# Patient Record
Sex: MALE | Race: WHITE
[De-identification: names, ages, dates, MRNs, and addresses within clinical notes are randomized per-mention and may not be internally consistent; named-entity substitution may affect disease eponyms.]

---

## 2017-03-20 ENCOUNTER — TRANSCRIPTION ENCOUNTER (OUTPATIENT)
Age: 37
End: 2017-03-20

## 2017-07-03 ENCOUNTER — OUTPATIENT (OUTPATIENT)
Dept: OUTPATIENT SERVICES | Facility: HOSPITAL | Age: 37
LOS: 1 days | Discharge: HOME | End: 2017-07-03

## 2017-07-03 DIAGNOSIS — J69.0 PNEUMONITIS DUE TO INHALATION OF FOOD AND VOMIT: ICD-10-CM

## 2017-07-06 ENCOUNTER — OUTPATIENT (OUTPATIENT)
Dept: OUTPATIENT SERVICES | Facility: HOSPITAL | Age: 37
LOS: 1 days | Discharge: HOME | End: 2017-07-06

## 2017-07-06 DIAGNOSIS — J69.0 PNEUMONITIS DUE TO INHALATION OF FOOD AND VOMIT: ICD-10-CM

## 2017-07-06 DIAGNOSIS — F84.0 AUTISTIC DISORDER: ICD-10-CM

## 2017-07-06 DIAGNOSIS — Z00.00 ENCOUNTER FOR GENERAL ADULT MEDICAL EXAMINATION WITHOUT ABNORMAL FINDINGS: ICD-10-CM

## 2017-09-20 ENCOUNTER — OUTPATIENT (OUTPATIENT)
Dept: OUTPATIENT SERVICES | Facility: HOSPITAL | Age: 37
LOS: 1 days | Discharge: HOME | End: 2017-09-20

## 2017-09-20 DIAGNOSIS — J69.0 PNEUMONITIS DUE TO INHALATION OF FOOD AND VOMIT: ICD-10-CM

## 2017-09-20 DIAGNOSIS — R10.31 RIGHT LOWER QUADRANT PAIN: ICD-10-CM

## 2017-09-22 ENCOUNTER — OUTPATIENT (OUTPATIENT)
Dept: OUTPATIENT SERVICES | Facility: HOSPITAL | Age: 37
LOS: 1 days | Discharge: HOME | End: 2017-09-22

## 2017-09-22 DIAGNOSIS — J69.0 PNEUMONITIS DUE TO INHALATION OF FOOD AND VOMIT: ICD-10-CM

## 2017-09-22 DIAGNOSIS — D72.829 ELEVATED WHITE BLOOD CELL COUNT, UNSPECIFIED: ICD-10-CM

## 2017-10-26 ENCOUNTER — OUTPATIENT (OUTPATIENT)
Dept: OUTPATIENT SERVICES | Facility: HOSPITAL | Age: 37
LOS: 1 days | Discharge: HOME | End: 2017-10-26

## 2017-10-26 DIAGNOSIS — J69.0 PNEUMONITIS DUE TO INHALATION OF FOOD AND VOMIT: ICD-10-CM

## 2017-10-27 DIAGNOSIS — J02.9 ACUTE PHARYNGITIS, UNSPECIFIED: ICD-10-CM

## 2018-03-26 ENCOUNTER — OUTPATIENT (OUTPATIENT)
Dept: OUTPATIENT SERVICES | Facility: HOSPITAL | Age: 38
LOS: 1 days | Discharge: HOME | End: 2018-03-26

## 2018-03-26 DIAGNOSIS — F84.0 AUTISTIC DISORDER: ICD-10-CM

## 2018-03-26 DIAGNOSIS — Z00.00 ENCOUNTER FOR GENERAL ADULT MEDICAL EXAMINATION WITHOUT ABNORMAL FINDINGS: ICD-10-CM

## 2018-03-26 DIAGNOSIS — E66.01 MORBID (SEVERE) OBESITY DUE TO EXCESS CALORIES: ICD-10-CM

## 2018-03-26 DIAGNOSIS — E78.2 MIXED HYPERLIPIDEMIA: ICD-10-CM

## 2018-03-30 ENCOUNTER — OUTPATIENT (OUTPATIENT)
Dept: OUTPATIENT SERVICES | Facility: HOSPITAL | Age: 38
LOS: 1 days | Discharge: HOME | End: 2018-03-30

## 2018-03-30 DIAGNOSIS — F84.0 AUTISTIC DISORDER: ICD-10-CM

## 2018-03-30 DIAGNOSIS — E66.01 MORBID (SEVERE) OBESITY DUE TO EXCESS CALORIES: ICD-10-CM

## 2018-03-30 DIAGNOSIS — Z00.00 ENCOUNTER FOR GENERAL ADULT MEDICAL EXAMINATION WITHOUT ABNORMAL FINDINGS: ICD-10-CM

## 2018-03-30 DIAGNOSIS — E78.2 MIXED HYPERLIPIDEMIA: ICD-10-CM

## 2018-04-02 ENCOUNTER — OUTPATIENT (OUTPATIENT)
Dept: OUTPATIENT SERVICES | Facility: HOSPITAL | Age: 38
LOS: 1 days | Discharge: HOME | End: 2018-04-02

## 2018-04-02 DIAGNOSIS — Z00.00 ENCOUNTER FOR GENERAL ADULT MEDICAL EXAMINATION WITHOUT ABNORMAL FINDINGS: ICD-10-CM

## 2018-04-02 DIAGNOSIS — E78.00 PURE HYPERCHOLESTEROLEMIA, UNSPECIFIED: ICD-10-CM

## 2018-04-02 DIAGNOSIS — E66.01 MORBID (SEVERE) OBESITY DUE TO EXCESS CALORIES: ICD-10-CM

## 2018-04-02 DIAGNOSIS — F84.0 AUTISTIC DISORDER: ICD-10-CM

## 2018-12-04 ENCOUNTER — OUTPATIENT (OUTPATIENT)
Dept: OUTPATIENT SERVICES | Facility: HOSPITAL | Age: 38
LOS: 1 days | Discharge: HOME | End: 2018-12-04

## 2018-12-05 DIAGNOSIS — J02.9 ACUTE PHARYNGITIS, UNSPECIFIED: ICD-10-CM

## 2019-05-04 ENCOUNTER — OUTPATIENT (OUTPATIENT)
Dept: OUTPATIENT SERVICES | Facility: HOSPITAL | Age: 39
LOS: 1 days | Discharge: HOME | End: 2019-05-04

## 2019-05-04 DIAGNOSIS — Z00.00 ENCOUNTER FOR GENERAL ADULT MEDICAL EXAMINATION WITHOUT ABNORMAL FINDINGS: ICD-10-CM

## 2019-05-04 DIAGNOSIS — F84.0 AUTISTIC DISORDER: ICD-10-CM

## 2019-05-04 DIAGNOSIS — E66.01 MORBID (SEVERE) OBESITY DUE TO EXCESS CALORIES: ICD-10-CM

## 2019-08-21 ENCOUNTER — OUTPATIENT (OUTPATIENT)
Dept: OUTPATIENT SERVICES | Facility: HOSPITAL | Age: 39
LOS: 1 days | Discharge: HOME | End: 2019-08-21

## 2019-08-21 DIAGNOSIS — Z01.20 ENCOUNTER FOR DENTAL EXAMINATION AND CLEANING WITHOUT ABNORMAL FINDINGS: ICD-10-CM

## 2019-09-04 ENCOUNTER — OUTPATIENT (OUTPATIENT)
Dept: OUTPATIENT SERVICES | Facility: HOSPITAL | Age: 39
LOS: 1 days | Discharge: HOME | End: 2019-09-04

## 2019-09-13 DIAGNOSIS — Z01.20 ENCOUNTER FOR DENTAL EXAMINATION AND CLEANING WITHOUT ABNORMAL FINDINGS: ICD-10-CM

## 2020-05-20 ENCOUNTER — OUTPATIENT (OUTPATIENT)
Dept: OUTPATIENT SERVICES | Facility: HOSPITAL | Age: 40
LOS: 1 days | Discharge: HOME | End: 2020-05-20
Payer: MEDICARE

## 2020-05-20 DIAGNOSIS — R10.84 GENERALIZED ABDOMINAL PAIN: ICD-10-CM

## 2020-05-20 PROCEDURE — 76700 US EXAM ABDOM COMPLETE: CPT | Mod: 26

## 2021-09-19 ENCOUNTER — TRANSCRIPTION ENCOUNTER (OUTPATIENT)
Age: 41
End: 2021-09-19

## 2022-01-12 PROBLEM — Z00.00 ENCOUNTER FOR PREVENTIVE HEALTH EXAMINATION: Status: ACTIVE | Noted: 2022-01-12

## 2022-02-02 ENCOUNTER — APPOINTMENT (OUTPATIENT)
Dept: SURGERY | Facility: CLINIC | Age: 42
End: 2022-02-02

## 2022-08-24 ENCOUNTER — EMERGENCY (EMERGENCY)
Facility: HOSPITAL | Age: 42
LOS: 0 days | Discharge: HOME | End: 2022-08-24
Attending: EMERGENCY MEDICINE | Admitting: EMERGENCY MEDICINE

## 2022-08-24 VITALS
DIASTOLIC BLOOD PRESSURE: 86 MMHG | HEART RATE: 94 BPM | SYSTOLIC BLOOD PRESSURE: 139 MMHG | TEMPERATURE: 97 F | OXYGEN SATURATION: 99 % | RESPIRATION RATE: 17 BRPM

## 2022-08-24 DIAGNOSIS — K08.89 OTHER SPECIFIED DISORDERS OF TEETH AND SUPPORTING STRUCTURES: ICD-10-CM

## 2022-08-24 PROCEDURE — 99282 EMERGENCY DEPT VISIT SF MDM: CPT

## 2022-08-24 NOTE — CONSULT NOTE ADULT - SUBJECTIVE AND OBJECTIVE BOX
Patient is a 41y old  Male who presents with a chief complaint of suspected dental pain    HPI: Patient dad report patient making mouth movement that may had correlated to dental pain. Patient report patient eating well.      PAST MEDICAL & SURGICAL HISTORY:    ( -  ) heart valve replacement  ( - ) joint replacement  ( -  ) pregnancy    MEDICATIONS  (STANDING):    MEDICATIONS  (PRN):      Allergies    No Known Allergies    Intolerances        FAMILY HISTORY:      *Last Dental Visit: unknown    Vital Signs Last 24 Hrs  T(C): 36.1 (24 Aug 2022 14:34), Max: 36.1 (24 Aug 2022 14:34)  T(F): 97 (24 Aug 2022 14:34), Max: 97 (24 Aug 2022 14:34)  HR: 94 (24 Aug 2022 14:34) (94 - 94)  BP: 139/86 (24 Aug 2022 14:34) (139/86 - 139/86)  BP(mean): --  RR: 17 (24 Aug 2022 14:34) (17 - 17)  SpO2: 99% (24 Aug 2022 14:34) (99% - 99%)    Parameters below as of 24 Aug 2022 14:34  Patient On (Oxygen Delivery Method): room air        EOE:  TMJ (  - ) clicks                     ( -  ) pops                     (  - ) crepitus             Mandible <<FROM>>             Facial bones and MOM <<grossly intact>>             ( -  ) trismus             ( -  ) lymphadenopathy             ( -  ) swelling             ( -  ) asymmetry             (   ) palpation             ( -  ) dyspnea             ( -  ) dysphagia             ( -  ) loss of consciousness    IOE:  <<permanent>> dentition:  <<multiple missing teeth>>           hard/soft palate:  (  - ) palatal torus, <<No pathology noted>>           tongue/FOM <<No pathology noted>>           labial/buccal mucosa <<No pathology noted>>           ( -  ) percussion           ( - ) palpation           ( -  ) swelling            (  - ) abscess           ( -  ) sinus tract    Dentition present: << Y  >>  Mobility: <<n  >>  Caries: << n  >>     Extraoral exam and intraoral exam : no significant finding. Existing restoration intact.    *DENTAL RADIOGRAPHS: 1 Panoramic, no significant finding    RADIOLOGY & ADDITIONAL STUDIES:    *ASSESSMENT: Patient is a 41y old  Male who presents with a chief complaint of suspected dental pain. Patient dad report patient making mouth movement that may had correlated to dental pain. Extraoral exam and intraoral exam : no significant finding. Existing restoration intact. 1 Panoramic, no significant finding        *PLAN:    Informed father no significant dental finding noted at this time. Recommended monitor patient behavior and eating habit. If behavior worsen return for follow up.    RECOMMENDATIONS:  1) Dental F/U with outpatient dentist for comprehensive dental care.   2) If any difficulty swallowing/breathing, fever occur, return to ER.     Cruz Paz DDS, [id number] Patient is a 41y old  Male who presents with a chief complaint of suspected dental pain    HPI: Patient father report patient making mouth movement that may had correlated to dental pain. Patient father report patient eating well.      PAST MEDICAL & SURGICAL HISTORY:    ( -  ) heart valve replacement  ( - ) joint replacement  ( -  ) pregnancy    MEDICATIONS  (STANDING):    MEDICATIONS  (PRN):      Allergies    No Known Allergies    Intolerances        FAMILY HISTORY:      *Last Dental Visit: unknown    Vital Signs Last 24 Hrs  T(C): 36.1 (24 Aug 2022 14:34), Max: 36.1 (24 Aug 2022 14:34)  T(F): 97 (24 Aug 2022 14:34), Max: 97 (24 Aug 2022 14:34)  HR: 94 (24 Aug 2022 14:34) (94 - 94)  BP: 139/86 (24 Aug 2022 14:34) (139/86 - 139/86)  BP(mean): --  RR: 17 (24 Aug 2022 14:34) (17 - 17)  SpO2: 99% (24 Aug 2022 14:34) (99% - 99%)    Parameters below as of 24 Aug 2022 14:34  Patient On (Oxygen Delivery Method): room air        EOE:  TMJ (  - ) clicks                     ( -  ) pops                     (  - ) crepitus             Mandible <<FROM>>             Facial bones and MOM <<grossly intact>>             ( -  ) trismus             ( -  ) lymphadenopathy             ( -  ) swelling             ( -  ) asymmetry             (   ) palpation             ( -  ) dyspnea             ( -  ) dysphagia             ( -  ) loss of consciousness    IOE:  <<permanent>> dentition:  <<multiple missing teeth>>           hard/soft palate:  (  - ) palatal torus, <<No pathology noted>>           tongue/FOM <<No pathology noted>>           labial/buccal mucosa <<No pathology noted>>           ( -  ) percussion           ( - ) palpation           ( -  ) swelling            (  - ) abscess           ( -  ) sinus tract    Dentition present: << Y  >>  Mobility: <<n  >>  Caries: << n  >>     Extraoral exam and intraoral exam : no significant finding. Existing restoration intact.    *DENTAL RADIOGRAPHS: 1 Panoramic, no significant finding    RADIOLOGY & ADDITIONAL STUDIES:    *ASSESSMENT: Patient is a 41y old  Male who presents with a chief complaint of suspected dental pain. Patient dad report patient making mouth movement that may had correlated to dental pain. Extraoral exam and intraoral exam : no significant finding. Existing restoration intact. 1 Panoramic, no significant finding        *PLAN:    Informed father no significant dental finding noted at this time. Recommended monitor patient behavior and eating habit. If behavior worsen return for follow up.    RECOMMENDATIONS:  1) Dental F/U with outpatient dentist for comprehensive dental care.   2) If any difficulty swallowing/breathing, fever occur, return to ER.     Cruz Paz DDS, [id number]

## 2022-08-24 NOTE — ED PROVIDER NOTE - OBJECTIVE STATEMENT
Patient with hx of MR, making mouth movements that he always does when he has dental pain per aide. Trying to get in touch with dental clinic but unable to do so. Denies fevers.

## 2022-08-24 NOTE — ED PROVIDER NOTE - PHYSICAL EXAMINATION
Vital Signs: I have reviewed the initial vital signs.  Constitutional: appears stated age, no acute distress  Cardiovascular: regular rate, regular rhythm, well-perfused extremities  Respiratory: unlabored respiratory effort, clear to auscultation bilaterally  Psychiatric: appropriate mood, appropriate affect

## 2022-10-28 ENCOUNTER — NON-APPOINTMENT (OUTPATIENT)
Age: 42
End: 2022-10-28

## 2022-11-13 NOTE — ED ADULT NURSE NOTE - CAS TRG GENERAL NORM CIRC DET
Nurses Note -- 4 Eyes      11/13/2022   5:21 PM      Skin assessed during: Admit      [x] No Pressure Injuries Present    [x]Prevention Measures Documented      [] Yes- Altered Skin Integrity Present or Discovered   [] LDA Added if Not in Epic (Describe Wound)   [] New Altered Skin Integrity was Present on Admit and Documented in LDA   [] Wound Image Taken    Wound Care Consulted? No    Attending Nurse:  Rupinder Barker RN     Second RN/Staff Member:  Tyrone Barton      Strong peripheral pulses

## 2022-12-25 ENCOUNTER — NON-APPOINTMENT (OUTPATIENT)
Age: 42
End: 2022-12-25

## 2023-09-20 ENCOUNTER — OUTPATIENT (OUTPATIENT)
Dept: OUTPATIENT SERVICES | Facility: HOSPITAL | Age: 43
LOS: 1 days | End: 2023-09-20
Payer: MEDICAID

## 2023-09-20 DIAGNOSIS — Z01.20 ENCOUNTER FOR DENTAL EXAMINATION AND CLEANING WITHOUT ABNORMAL FINDINGS: ICD-10-CM

## 2023-09-20 DIAGNOSIS — K05.00 ACUTE GINGIVITIS, PLAQUE INDUCED: ICD-10-CM

## 2023-09-20 PROCEDURE — D1110: CPT

## 2023-09-20 PROCEDURE — D9997: CPT

## 2023-09-26 ENCOUNTER — APPOINTMENT (OUTPATIENT)
Dept: OPHTHALMOLOGY | Facility: CLINIC | Age: 43
End: 2023-09-26

## 2023-11-13 ENCOUNTER — EMERGENCY (EMERGENCY)
Facility: HOSPITAL | Age: 43
LOS: 0 days | Discharge: LEFT BEFORE DISCHARGE | End: 2023-11-13
Attending: STUDENT IN AN ORGANIZED HEALTH CARE EDUCATION/TRAINING PROGRAM
Payer: MEDICARE

## 2023-11-13 VITALS
RESPIRATION RATE: 16 BRPM | TEMPERATURE: 98 F | SYSTOLIC BLOOD PRESSURE: 132 MMHG | OXYGEN SATURATION: 98 % | HEART RATE: 98 BPM | DIASTOLIC BLOOD PRESSURE: 86 MMHG

## 2023-11-13 VITALS
DIASTOLIC BLOOD PRESSURE: 81 MMHG | HEART RATE: 75 BPM | SYSTOLIC BLOOD PRESSURE: 125 MMHG | OXYGEN SATURATION: 99 % | RESPIRATION RATE: 18 BRPM | TEMPERATURE: 98 F
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
## 2023-11-13 DIAGNOSIS — M79.662 PAIN IN LEFT LOWER LEG: ICD-10-CM

## 2023-11-13 DIAGNOSIS — Z53.29 PROCEDURE AND TREATMENT NOT CARRIED OUT BECAUSE OF PATIENT'S DECISION FOR OTHER REASONS: ICD-10-CM

## 2023-11-13 PROCEDURE — 99284 EMERGENCY DEPT VISIT MOD MDM: CPT | Mod: 25

## 2023-11-13 PROCEDURE — 73552 X-RAY EXAM OF FEMUR 2/>: CPT | Mod: 50

## 2023-11-13 PROCEDURE — 73590 X-RAY EXAM OF LOWER LEG: CPT | Mod: 26,50

## 2023-11-13 PROCEDURE — 73562 X-RAY EXAM OF KNEE 3: CPT | Mod: 50

## 2023-11-13 PROCEDURE — 72190 X-RAY EXAM OF PELVIS: CPT | Mod: 26

## 2023-11-13 PROCEDURE — 99284 EMERGENCY DEPT VISIT MOD MDM: CPT

## 2023-11-13 PROCEDURE — 72190 X-RAY EXAM OF PELVIS: CPT

## 2023-11-13 PROCEDURE — 73620 X-RAY EXAM OF FOOT: CPT | Mod: 26,50

## 2023-11-13 PROCEDURE — 73590 X-RAY EXAM OF LOWER LEG: CPT | Mod: 50

## 2023-11-13 PROCEDURE — 73600 X-RAY EXAM OF ANKLE: CPT | Mod: 26,50

## 2023-11-13 PROCEDURE — 73620 X-RAY EXAM OF FOOT: CPT | Mod: 50

## 2023-11-13 PROCEDURE — 73552 X-RAY EXAM OF FEMUR 2/>: CPT | Mod: 26,50

## 2023-11-13 PROCEDURE — 73562 X-RAY EXAM OF KNEE 3: CPT | Mod: 26,50

## 2023-11-13 PROCEDURE — 73600 X-RAY EXAM OF ANKLE: CPT | Mod: 50

## 2023-11-13 NOTE — ED PROVIDER NOTE - CLINICAL SUMMARY MEDICAL DECISION MAKING FREE TEXT BOX
43M h/o autism p/w concern for LLE pain after a fall. He fell a few days ago on concrete. Since then the father has noticed worsening gait, favoring his L foot and ankle. Pt is otherwise at baseline and denies any complaints. There is no change in appetite, cough, change in breathing, vomiting, diarrhea, rash, fever.    VS: normal.    PE: See above. Only significant for L ankle TTP diffusely with no obvious deformity upon visual inspection. There are no skin changes, with normal pulses and ROM of L ankle as well as the rest of the LLE. Pt is able to ambulate however tends to favor that ankle or foot.     Workup: XR imaging of b/l LE's due to pt's intellectual disability. Labs are not required at least at this time due to no other change in the patient's appearance or behavior to indicate that any metabolic or infectious process is currently occurring.    ED Course: See progress note. No obvious abnormality to wet reads however there was concern for fx of L foot/ankle and I was pending discussion with radiology. I informed the father that pt may require a splint and be non-weight bearing depending on the possibility of an injury. Pt eloped.

## 2023-11-13 NOTE — ED PROVIDER NOTE - OBJECTIVE STATEMENT
43M h/o autism p/w concern for LLE pain after a fall. He fell a few days ago on concrete. Since then the father has noticed worsening gait, favoring his L foot and ankle. Pt is otherwise at baseline and denies any complaints. There is no change in appetite, cough, change in breathing, vomiting, diarrhea, rash, fever.

## 2023-11-13 NOTE — ED PROVIDER NOTE - PROGRESS NOTE DETAILS
AA: 2 calls made to radiology team at  for reads of L ankle and L foot XR. I did update the father and told him that I wanted a radiologist to take a look at the L ankle and foot XR however he was concerned that his son has not eaten. While this was pending, the pt's father and pt decided to leave without a discussion with myself. Will naseem as eloped.

## 2023-11-13 NOTE — ED PROVIDER NOTE - PHYSICAL EXAMINATION
Constitutional: Well developed, well nourished. NAD. Developmental delay/autistic.  Head: Atraumatic.  Eyes: PERRLA. EOMI without discomfort.   ENT: No nasal discharge. Mucous membranes moist.  Neck: Supple. Painless ROM.  Cardiovascular: Regular rhythm. Regular rate. Normal S1 and S2. No murmurs. 2+ pulses in all extremities.   Pulmonary: Normal respiratory rate and effort. Lungs clear to auscultation bilaterally. No wheezing, rales, or rhonchi. Bilateral, equal lung expansion.   Abdominal: Soft. Nondistended. Nontender. No rebound or guarding.   Extremities. Pelvis stable. No lower extremity edema. Symmetric calves. Mild L ankle TTP. No obvious deformity. normal gait.  Skin: No rashes. No erythema or ecchymosis.  Neuro: AAOx3. No focal neurological deficits. Normal gait.  Psych: Normal mood. Normal affect.

## 2024-01-12 ENCOUNTER — INPATIENT (INPATIENT)
Facility: HOSPITAL | Age: 44
LOS: 0 days | Discharge: HOME | DRG: 177 | End: 2024-01-13
Attending: HOSPITALIST | Admitting: INTERNAL MEDICINE
Payer: MEDICARE

## 2024-01-12 VITALS
HEIGHT: 66 IN | HEART RATE: 138 BPM | WEIGHT: 197.98 LBS | DIASTOLIC BLOOD PRESSURE: 97 MMHG | SYSTOLIC BLOOD PRESSURE: 137 MMHG | OXYGEN SATURATION: 98 % | TEMPERATURE: 99 F | RESPIRATION RATE: 20 BRPM

## 2024-01-12 DIAGNOSIS — J96.02 ACUTE RESPIRATORY FAILURE WITH HYPERCAPNIA: ICD-10-CM

## 2024-01-12 LAB
ALBUMIN SERPL ELPH-MCNC: 4.4 G/DL — SIGNIFICANT CHANGE UP (ref 3.5–5.2)
ALBUMIN SERPL ELPH-MCNC: 4.4 G/DL — SIGNIFICANT CHANGE UP (ref 3.5–5.2)
ALP SERPL-CCNC: 112 U/L — SIGNIFICANT CHANGE UP (ref 30–115)
ALP SERPL-CCNC: 112 U/L — SIGNIFICANT CHANGE UP (ref 30–115)
ALT FLD-CCNC: 34 U/L — SIGNIFICANT CHANGE UP (ref 0–41)
ALT FLD-CCNC: 34 U/L — SIGNIFICANT CHANGE UP (ref 0–41)
ANION GAP SERPL CALC-SCNC: 22 MMOL/L — HIGH (ref 7–14)
ANION GAP SERPL CALC-SCNC: 22 MMOL/L — HIGH (ref 7–14)
AST SERPL-CCNC: 64 U/L — HIGH (ref 0–41)
AST SERPL-CCNC: 64 U/L — HIGH (ref 0–41)
BASE EXCESS BLDV CALC-SCNC: 1.7 MMOL/L — SIGNIFICANT CHANGE UP (ref -2–3)
BASE EXCESS BLDV CALC-SCNC: 1.7 MMOL/L — SIGNIFICANT CHANGE UP (ref -2–3)
BASOPHILS # BLD AUTO: 0 K/UL — SIGNIFICANT CHANGE UP (ref 0–0.2)
BASOPHILS # BLD AUTO: 0 K/UL — SIGNIFICANT CHANGE UP (ref 0–0.2)
BASOPHILS NFR BLD AUTO: 0 % — SIGNIFICANT CHANGE UP (ref 0–1)
BASOPHILS NFR BLD AUTO: 0 % — SIGNIFICANT CHANGE UP (ref 0–1)
BILIRUB SERPL-MCNC: 0.6 MG/DL — SIGNIFICANT CHANGE UP (ref 0.2–1.2)
BILIRUB SERPL-MCNC: 0.6 MG/DL — SIGNIFICANT CHANGE UP (ref 0.2–1.2)
BUN SERPL-MCNC: 15 MG/DL — SIGNIFICANT CHANGE UP (ref 10–20)
BUN SERPL-MCNC: 15 MG/DL — SIGNIFICANT CHANGE UP (ref 10–20)
CALCIUM SERPL-MCNC: 9.6 MG/DL — SIGNIFICANT CHANGE UP (ref 8.4–10.5)
CALCIUM SERPL-MCNC: 9.6 MG/DL — SIGNIFICANT CHANGE UP (ref 8.4–10.5)
CHLORIDE SERPL-SCNC: 105 MMOL/L — SIGNIFICANT CHANGE UP (ref 98–110)
CHLORIDE SERPL-SCNC: 105 MMOL/L — SIGNIFICANT CHANGE UP (ref 98–110)
CO2 SERPL-SCNC: 14 MMOL/L — LOW (ref 17–32)
CO2 SERPL-SCNC: 14 MMOL/L — LOW (ref 17–32)
CREAT SERPL-MCNC: 1 MG/DL — SIGNIFICANT CHANGE UP (ref 0.7–1.5)
CREAT SERPL-MCNC: 1 MG/DL — SIGNIFICANT CHANGE UP (ref 0.7–1.5)
EGFR: 95 ML/MIN/1.73M2 — SIGNIFICANT CHANGE UP
EGFR: 95 ML/MIN/1.73M2 — SIGNIFICANT CHANGE UP
EOSINOPHIL # BLD AUTO: 0 K/UL — SIGNIFICANT CHANGE UP (ref 0–0.7)
EOSINOPHIL # BLD AUTO: 0 K/UL — SIGNIFICANT CHANGE UP (ref 0–0.7)
EOSINOPHIL NFR BLD AUTO: 0 % — SIGNIFICANT CHANGE UP (ref 0–8)
EOSINOPHIL NFR BLD AUTO: 0 % — SIGNIFICANT CHANGE UP (ref 0–8)
FLUAV AG NPH QL: SIGNIFICANT CHANGE UP
FLUAV AG NPH QL: SIGNIFICANT CHANGE UP
FLUBV AG NPH QL: SIGNIFICANT CHANGE UP
FLUBV AG NPH QL: SIGNIFICANT CHANGE UP
GAS PNL BLDV: 136 MMOL/L — SIGNIFICANT CHANGE UP (ref 136–145)
GAS PNL BLDV: 136 MMOL/L — SIGNIFICANT CHANGE UP (ref 136–145)
GAS PNL BLDV: SIGNIFICANT CHANGE UP
GLUCOSE SERPL-MCNC: 99 MG/DL — SIGNIFICANT CHANGE UP (ref 70–99)
GLUCOSE SERPL-MCNC: 99 MG/DL — SIGNIFICANT CHANGE UP (ref 70–99)
HCO3 BLDV-SCNC: 27 MMOL/L — SIGNIFICANT CHANGE UP (ref 22–29)
HCO3 BLDV-SCNC: 27 MMOL/L — SIGNIFICANT CHANGE UP (ref 22–29)
HCT VFR BLD CALC: 48.5 % — SIGNIFICANT CHANGE UP (ref 42–52)
HCT VFR BLD CALC: 48.5 % — SIGNIFICANT CHANGE UP (ref 42–52)
HGB BLD-MCNC: 16.4 G/DL — SIGNIFICANT CHANGE UP (ref 14–18)
HGB BLD-MCNC: 16.4 G/DL — SIGNIFICANT CHANGE UP (ref 14–18)
LACTATE BLDV-MCNC: 2.7 MMOL/L — HIGH (ref 0.5–2)
LACTATE BLDV-MCNC: 2.7 MMOL/L — HIGH (ref 0.5–2)
LYMPHOCYTES # BLD AUTO: 1.68 K/UL — SIGNIFICANT CHANGE UP (ref 1.2–3.4)
LYMPHOCYTES # BLD AUTO: 1.68 K/UL — SIGNIFICANT CHANGE UP (ref 1.2–3.4)
LYMPHOCYTES # BLD AUTO: 7.6 % — LOW (ref 20.5–51.1)
LYMPHOCYTES # BLD AUTO: 7.6 % — LOW (ref 20.5–51.1)
MCHC RBC-ENTMCNC: 29.9 PG — SIGNIFICANT CHANGE UP (ref 27–31)
MCHC RBC-ENTMCNC: 29.9 PG — SIGNIFICANT CHANGE UP (ref 27–31)
MCHC RBC-ENTMCNC: 33.8 G/DL — SIGNIFICANT CHANGE UP (ref 32–37)
MCHC RBC-ENTMCNC: 33.8 G/DL — SIGNIFICANT CHANGE UP (ref 32–37)
MCV RBC AUTO: 88.3 FL — SIGNIFICANT CHANGE UP (ref 80–94)
MCV RBC AUTO: 88.3 FL — SIGNIFICANT CHANGE UP (ref 80–94)
MONOCYTES # BLD AUTO: 1.13 K/UL — HIGH (ref 0.1–0.6)
MONOCYTES # BLD AUTO: 1.13 K/UL — HIGH (ref 0.1–0.6)
MONOCYTES NFR BLD AUTO: 5.1 % — SIGNIFICANT CHANGE UP (ref 1.7–9.3)
MONOCYTES NFR BLD AUTO: 5.1 % — SIGNIFICANT CHANGE UP (ref 1.7–9.3)
NEUTROPHILS # BLD AUTO: 18.6 K/UL — HIGH (ref 1.4–6.5)
NEUTROPHILS # BLD AUTO: 18.6 K/UL — HIGH (ref 1.4–6.5)
NEUTROPHILS NFR BLD AUTO: 83.9 % — HIGH (ref 42.2–75.2)
NEUTROPHILS NFR BLD AUTO: 83.9 % — HIGH (ref 42.2–75.2)
PCO2 BLDV: 45 MMHG — SIGNIFICANT CHANGE UP (ref 42–55)
PCO2 BLDV: 45 MMHG — SIGNIFICANT CHANGE UP (ref 42–55)
PH BLDV: 7.39 — SIGNIFICANT CHANGE UP (ref 7.32–7.43)
PH BLDV: 7.39 — SIGNIFICANT CHANGE UP (ref 7.32–7.43)
PLATELET # BLD AUTO: 309 K/UL — SIGNIFICANT CHANGE UP (ref 130–400)
PLATELET # BLD AUTO: 309 K/UL — SIGNIFICANT CHANGE UP (ref 130–400)
PMV BLD: 9.7 FL — SIGNIFICANT CHANGE UP (ref 7.4–10.4)
PMV BLD: 9.7 FL — SIGNIFICANT CHANGE UP (ref 7.4–10.4)
PO2 BLDV: 54 MMHG — HIGH (ref 25–45)
PO2 BLDV: 54 MMHG — HIGH (ref 25–45)
POTASSIUM BLDV-SCNC: 4.6 MMOL/L — SIGNIFICANT CHANGE UP (ref 3.5–5.1)
POTASSIUM BLDV-SCNC: 4.6 MMOL/L — SIGNIFICANT CHANGE UP (ref 3.5–5.1)
POTASSIUM SERPL-MCNC: 5.7 MMOL/L — HIGH (ref 3.5–5)
POTASSIUM SERPL-MCNC: 5.7 MMOL/L — HIGH (ref 3.5–5)
POTASSIUM SERPL-SCNC: 5.7 MMOL/L — HIGH (ref 3.5–5)
POTASSIUM SERPL-SCNC: 5.7 MMOL/L — HIGH (ref 3.5–5)
PROT SERPL-MCNC: 7.6 G/DL — SIGNIFICANT CHANGE UP (ref 6–8)
PROT SERPL-MCNC: 7.6 G/DL — SIGNIFICANT CHANGE UP (ref 6–8)
RBC # BLD: 5.49 M/UL — SIGNIFICANT CHANGE UP (ref 4.7–6.1)
RBC # BLD: 5.49 M/UL — SIGNIFICANT CHANGE UP (ref 4.7–6.1)
RBC # FLD: 13.4 % — SIGNIFICANT CHANGE UP (ref 11.5–14.5)
RBC # FLD: 13.4 % — SIGNIFICANT CHANGE UP (ref 11.5–14.5)
RSV RNA NPH QL NAA+NON-PROBE: SIGNIFICANT CHANGE UP
RSV RNA NPH QL NAA+NON-PROBE: SIGNIFICANT CHANGE UP
SAO2 % BLDV: 86.9 % — SIGNIFICANT CHANGE UP (ref 67–88)
SAO2 % BLDV: 86.9 % — SIGNIFICANT CHANGE UP (ref 67–88)
SARS-COV-2 RNA SPEC QL NAA+PROBE: DETECTED
SARS-COV-2 RNA SPEC QL NAA+PROBE: DETECTED
SODIUM SERPL-SCNC: 141 MMOL/L — SIGNIFICANT CHANGE UP (ref 135–146)
SODIUM SERPL-SCNC: 141 MMOL/L — SIGNIFICANT CHANGE UP (ref 135–146)
WBC # BLD: 22.17 K/UL — HIGH (ref 4.8–10.8)
WBC # BLD: 22.17 K/UL — HIGH (ref 4.8–10.8)
WBC # FLD AUTO: 22.17 K/UL — HIGH (ref 4.8–10.8)
WBC # FLD AUTO: 22.17 K/UL — HIGH (ref 4.8–10.8)

## 2024-01-12 PROCEDURE — 83605 ASSAY OF LACTIC ACID: CPT

## 2024-01-12 PROCEDURE — 80053 COMPREHEN METABOLIC PANEL: CPT

## 2024-01-12 PROCEDURE — 94660 CPAP INITIATION&MGMT: CPT

## 2024-01-12 PROCEDURE — 71045 X-RAY EXAM CHEST 1 VIEW: CPT

## 2024-01-12 PROCEDURE — 92610 EVALUATE SWALLOWING FUNCTION: CPT | Mod: GN

## 2024-01-12 PROCEDURE — 85379 FIBRIN DEGRADATION QUANT: CPT

## 2024-01-12 PROCEDURE — 36415 COLL VENOUS BLD VENIPUNCTURE: CPT

## 2024-01-12 PROCEDURE — 83735 ASSAY OF MAGNESIUM: CPT

## 2024-01-12 PROCEDURE — 99291 CRITICAL CARE FIRST HOUR: CPT

## 2024-01-12 PROCEDURE — 85025 COMPLETE CBC W/AUTO DIFF WBC: CPT

## 2024-01-12 PROCEDURE — 99223 1ST HOSP IP/OBS HIGH 75: CPT

## 2024-01-12 PROCEDURE — 71045 X-RAY EXAM CHEST 1 VIEW: CPT | Mod: 26

## 2024-01-12 PROCEDURE — 84145 PROCALCITONIN (PCT): CPT

## 2024-01-12 RX ORDER — ENOXAPARIN SODIUM 100 MG/ML
40 INJECTION SUBCUTANEOUS EVERY 24 HOURS
Refills: 0 | Status: DISCONTINUED | OUTPATIENT
Start: 2024-01-12 | End: 2024-01-13

## 2024-01-12 RX ORDER — SODIUM CHLORIDE 9 MG/ML
1000 INJECTION, SOLUTION INTRAVENOUS
Refills: 0 | Status: DISCONTINUED | OUTPATIENT
Start: 2024-01-12 | End: 2024-01-13

## 2024-01-12 RX ORDER — PANTOPRAZOLE SODIUM 20 MG/1
40 TABLET, DELAYED RELEASE ORAL
Refills: 0 | Status: DISCONTINUED | OUTPATIENT
Start: 2024-01-12 | End: 2024-01-13

## 2024-01-12 RX ORDER — ESCITALOPRAM OXALATE 10 MG/1
2 TABLET, FILM COATED ORAL
Refills: 0 | DISCHARGE

## 2024-01-12 RX ORDER — LEVOTHYROXINE SODIUM 125 MCG
1 TABLET ORAL
Refills: 0 | DISCHARGE

## 2024-01-12 RX ORDER — ALBUTEROL 90 UG/1
2.5 AEROSOL, METERED ORAL ONCE
Refills: 0 | Status: COMPLETED | OUTPATIENT
Start: 2024-01-12 | End: 2024-01-12

## 2024-01-12 RX ORDER — AMPICILLIN SODIUM AND SULBACTAM SODIUM 250; 125 MG/ML; MG/ML
3 INJECTION, POWDER, FOR SUSPENSION INTRAMUSCULAR; INTRAVENOUS ONCE
Refills: 0 | Status: COMPLETED | OUTPATIENT
Start: 2024-01-12 | End: 2024-01-13

## 2024-01-12 RX ORDER — SODIUM CHLORIDE 9 MG/ML
1000 INJECTION, SOLUTION INTRAVENOUS ONCE
Refills: 0 | Status: COMPLETED | OUTPATIENT
Start: 2024-01-12 | End: 2024-01-12

## 2024-01-12 RX ORDER — AMPICILLIN SODIUM AND SULBACTAM SODIUM 250; 125 MG/ML; MG/ML
3 INJECTION, POWDER, FOR SUSPENSION INTRAMUSCULAR; INTRAVENOUS EVERY 6 HOURS
Refills: 0 | Status: DISCONTINUED | OUTPATIENT
Start: 2024-01-13 | End: 2024-01-13

## 2024-01-12 RX ORDER — AMPICILLIN SODIUM AND SULBACTAM SODIUM 250; 125 MG/ML; MG/ML
INJECTION, POWDER, FOR SUSPENSION INTRAMUSCULAR; INTRAVENOUS
Refills: 0 | Status: DISCONTINUED | OUTPATIENT
Start: 2024-01-13 | End: 2024-01-13

## 2024-01-12 RX ORDER — AMPICILLIN SODIUM AND SULBACTAM SODIUM 250; 125 MG/ML; MG/ML
3 INJECTION, POWDER, FOR SUSPENSION INTRAMUSCULAR; INTRAVENOUS ONCE
Refills: 0 | Status: COMPLETED | OUTPATIENT
Start: 2024-01-12 | End: 2024-01-12

## 2024-01-12 RX ORDER — DEXAMETHASONE 0.5 MG/5ML
10 ELIXIR ORAL ONCE
Refills: 0 | Status: COMPLETED | OUTPATIENT
Start: 2024-01-12 | End: 2024-01-12

## 2024-01-12 RX ADMIN — ALBUTEROL 2.5 MILLIGRAM(S): 90 AEROSOL, METERED ORAL at 14:31

## 2024-01-12 RX ADMIN — AMPICILLIN SODIUM AND SULBACTAM SODIUM 200 GRAM(S): 250; 125 INJECTION, POWDER, FOR SUSPENSION INTRAMUSCULAR; INTRAVENOUS at 14:55

## 2024-01-12 RX ADMIN — SODIUM CHLORIDE 1000 MILLILITER(S): 9 INJECTION, SOLUTION INTRAVENOUS at 14:21

## 2024-01-12 RX ADMIN — SODIUM CHLORIDE 1000 MILLILITER(S): 9 INJECTION, SOLUTION INTRAVENOUS at 15:40

## 2024-01-12 RX ADMIN — SODIUM CHLORIDE 75 MILLILITER(S): 9 INJECTION, SOLUTION INTRAVENOUS at 23:53

## 2024-01-12 RX ADMIN — Medication 102 MILLIGRAM(S): at 14:06

## 2024-01-12 NOTE — H&P ADULT - HISTORY OF PRESENT ILLNESS
43-year-old male history of autism, nonverbal presenting for evaluation of lethargy, SOB and hypoxia after a dental procedure.   Patient was intubated for dentalcleaning at an ambulatory dental office with deep sedation (as pt difficult). Per office records " pt given [id number], fentanyl 100mg and versed 2mg for premedication, propofol 100mg and robinul 2mg for induction.  pt was under anesthesia from 9am to 10:05 am." anesthesia paperwork states "10 minutes post intubation, foamy liquid removed from ett. pt saturated 89%. copious amounts of foamy liquid was removed over 30 minutes."  Father says he walked out of the dental office, was taked to his PCP where his spo2 further dropped and was sent to the ED.  as per ED record: pt initially more tired at triage but is more alert as he comes to critical care area.   At my encounter pt is awake alert comfortably sleeping, saturating 100% on 4L NC.    in ED,   spo2 83 on RA ==> was placed on BiPAP  wbc 22.7   K 5.7   AG 22  AST 64  lact 5.1 ==> 2.1 on VBG  pco2 79 on ABG, HCO3 31 on ABG  ekg: sinus tahcy  cxr: no new path    Pt was placed on Bipap in the ED ==> NRB ==> NC  sp unasyn  +  2l bolus  +  dexa 10   +   albuterol     43-year-old male history of autism, nonverbal presenting for evaluation of lethargy, SOB and hypoxia after a dental procedure.   Patient was intubated for dentalcleaning at an ambulatory dental office with deep sedation (as pt difficult). Per office records " pt given [id number], fentanyl 100mg and versed 2mg for premedication, propofol 100mg and robinul 2mg for induction.  pt was under anesthesia from 9am to 10:05 am." anesthesia paperwork states "10 minutes post intubation, foamy liquid removed from ett. pt saturated 89%. copious amounts of foamy liquid was removed over 30 minutes."  Father says he walked out of the dental office, was taked to his PCP where his spo2 further dropped and was sent to the ED.  as per ED record: pt initially more tired at triage but is more alert as he comes to critical care area.   At my encounter pt is awake alert comfortably sleeping, saturating 100% on 4L NC.    in ED,   spo2 83 on RA ==> was placed on BiPAP  wbc 22.7   K 5.7   AG 22  AST 64  lact 5.1 ==> 2.1 on VBG  pco2 79 on ABG, HCO3 31 on ABG  ekg: sinus tahcy  cxr: no new path    Pt was placed on Bipap in the ED ==> NRB ==> NC  sp unasyn  +  2l bolus  +  dexa 10   +   albuterol

## 2024-01-12 NOTE — ED PROVIDER NOTE - PHYSICAL EXAMINATION
Vital Signs: I have reviewed the initial vital signs.  HEENT: Airway patent, moist MM, EOMI, PERRLA. (+) hoarse voice  CV: tachycardic no murmurs  Lungs: coarse breath sounds BL lower  ABD: Non-tender, Non-distended,   MSK: Neck supple, nontender, normal range of motion,   INTEG: Skin warm, dry, no rash.  NEURO: A&Ox1, moving all extremities, sleepy but at baseline per family

## 2024-01-12 NOTE — ED ADULT TRIAGE NOTE - CHIEF COMPLAINT QUOTE
Pt intubated today for dental procedure, sent by outpt MD due to possible aspiration and SOB. Pt lethargic in triage. Pt intubated today for dental procedure, sent by outpt MD due to possible aspiration and SOB. Pt lethargic in triage.  PTA.

## 2024-01-12 NOTE — ED PROVIDER NOTE - COVID-19 ORDERING FACILITY
[facility]  - [facility] at [facility] [place] [facility]  - [facility] at [facility]

## 2024-01-12 NOTE — ED ADULT NURSE NOTE - NSFALLUNIVINTERV_ED_ALL_ED
Bed/Stretcher in lowest position, wheels locked, appropriate side rails in place/Call bell, personal items and telephone in reach/Instruct patient to call for assistance before getting out of bed/chair/stretcher/Non-slip footwear applied when patient is off stretcher/[place] to call system/Physically safe environment - no spills, clutter or unnecessary equipment/Purposeful proactive rounding/Room/bathroom lighting operational, light cord in reach Bed/Stretcher in lowest position, wheels locked, appropriate side rails in place/Call bell, personal items and telephone in reach/Instruct patient to call for assistance before getting out of bed/chair/stretcher/Non-slip footwear applied when patient is off stretcher/[place] to call system/Physically safe environment - no spills, clutter or unnecessary equipment/Purposeful proactive rounding/Room/bathroom lighting operational, light cord in reach

## 2024-01-12 NOTE — H&P ADULT - ATTENDING COMMENTS
43-year-old male history of autism, nonverbal presenting for evaluation of lethargy, possible aspiration and shortness of breath after dental procedure.  Patient was intubated for dental rehab of dental caries and ambulatory dental office with deep sedation. Per office records " Patient received fentanyl 100, Versed 2, ketamine 10.  Was induced with propofol 100 and ana laura apparently 2.  10 minutes postintubation foamy liquid was removed from ET tube patient desaturated to 89% with copious amount of foam and liquid removed over the past 30 minutes."  Patient was sent to the ED for follow-up. 43-year-old male history of autism, nonverbal presenting for evaluation of lethargy, possible aspiration and shortness of breath after dental procedure.  Patient was intubated for dental rehab of dental caries and ambulatory dental office with deep sedation. Per office records " Patient received fentanyl 100, Versed 2, ketamine 10.  Was induced with propofol 100 and ana laura apparently 2.  10 minutes postintubation foamy liquid was removed from ET tube patient desaturated to 89% with copious amount of foam and liquid removed over the past 30 minutes."  Patient father was told by the anesthesiologist he was concerned for secretion and his lung sounds. He recommended to take patient to the ED. Father opted to take son to the PCP and have him evaluated by him . However he said physican saw that the patient is hypoxic and tachypneic . Patient was sent to the ED for follow-up.    In the ED     T(F): 99 (12 Jan 2024 13:42), Max: 99 (12 Jan 2024 13:42)  HR: 105 (12 Jan 2024 20:00) (105 - 138)  BP: 127/66 (12 Jan 2024 20:00) (127/66 - 137/97)  RR: 18 (12 Jan 2024 20:00) (16 - 20)  SpO2: 97% (12 Jan 2024 20:00) (83% - 99%)    O2 Parameters below as of 12 Jan 2024 22:28  Patient On (Oxygen Delivery Method): nasal cannula  O2 Flow (L/min): 3      LABS: significant for leukocytosis. VBG showing Hypercapnia resolved with BIPAP    Patient examined off bipap and tolerating it well       < from: Xray Chest 1 View- PORTABLE-Urgent (01.12.24 @ 14:36) >    Impression:    Low lung volumes with no radiographic evidence of acute cardiopulmonary   disease.    < end of copied text >  GENERAL: NAD, lying in bed comfortably  CHEST/LUNG: lungs clear.   HEART: Regular rate and rhythm; No murmurs, rubs, or gallops  ABDOMEN: Bowel sounds present; Soft, Nontender, Nondistended.    EXTREMITIES:  no edema  NERVOUS SYSTEM:  unable to assess as patient is nonverbal but follows commands  MSK: FROM all 4 extremities, full and equal strengths    Impression    AHFR with hypercapnia - possible OHS/RUFINO  Aspiration pneumonitis  tachycardia   Hypothyroid  Anxiety  Autism     Plan   Continue unasyn   feeding if tolerating off bipap/ fluid if not eating   wean off o2   Bipap at night and as needed   Check tsh   repeat cxr in am   continue home meds   hold benzo   Pulm follow up outpatient for sleep study     Dispo: wean off bipap, IV abx, monitor for fever   likely anticipate dc simone am 43-year-old male history of autism, nonverbal presenting for evaluation of lethargy, possible aspiration and shortness of breath after dental procedure.  Patient was intubated for dental rehab of dental caries and ambulatory dental office with deep sedation. Per office records " Patient received fentanyl 100, Versed 2, ketamine 10.  Was induced with propofol 100 and ana laura apparently 2.  10 minutes postintubation foamy liquid was removed from ET tube patient desaturated to 89% with copious amount of foam and liquid removed over the past 30 minutes."  Patient father was told by the anesthesiologist he was concerned for secretion and his lung sounds. He recommended to take patient to the ED. Father opted to take son to the PCP and have him evaluated by him . However he said physician saw that the patient is hypoxic and tachypneic . Patient was sent to the ED for follow-up.    In the ED     T(F): 99 (12 Jan 2024 13:42), Max: 99 (12 Jan 2024 13:42)  HR: 105 (12 Jan 2024 20:00) (105 - 138)  BP: 127/66 (12 Jan 2024 20:00) (127/66 - 137/97)  RR: 18 (12 Jan 2024 20:00) (16 - 20)  SpO2: 97% (12 Jan 2024 20:00) (83% - 99%)    O2 Parameters below as of 12 Jan 2024 22:28  Patient On (Oxygen Delivery Method): nasal cannula  O2 Flow (L/min): 3      LABS: significant for leukocytosis. VBG showing Hypercapnia resolved with BIPAP    Patient examined off bipap and tolerating it well       < from: Xray Chest 1 View- PORTABLE-Urgent (01.12.24 @ 14:36) >    Impression:    Low lung volumes with no radiographic evidence of acute cardiopulmonary   disease.    < end of copied text >  GENERAL: NAD, lying in bed comfortably  CHEST/LUNG: lungs clear.   HEART: Regular rate and rhythm; No murmurs, rubs, or gallops  ABDOMEN: Bowel sounds present; Soft, Nontender, Nondistended.    EXTREMITIES:  no edema  NERVOUS SYSTEM:  unable to assess as patient is nonverbal but follows commands  MSK: FROM all 4 extremities, full and equal strengths    Impression    AHFR with hypercapnia - possible OHS/RUFINO  Aspiration pneumonitis?  HAGMA- lactic acidosis   tachycardia   Hypothyroid  Anxiety  Autism     Plan   Continue unasyn   start fluids 75 cc/hr  Duoneb prn   feeding if tolerating off bipap  wean off o2   Bipap at night and as needed   Check tsh   repeat lactate  repeat cxr in am   continue home meds   hold benzo   Pulm follow up outpatient for sleep study     Dispo: wean off bipap, IV abx, monitor for fever , lactic acid,   likely anticipate dc simone am 43-year-old male history of autism, nonverbal presenting for evaluation of lethargy, possible aspiration and shortness of breath after dental procedure.  Patient was intubated for dental rehab of dental caries and ambulatory dental office with deep sedation. Per office records " Patient received fentanyl 100, Versed 2, ketamine 10.  Was induced with propofol 100 and ana laura apparently 2.  10 minutes postintubation foamy liquid was removed from ET tube patient desaturated to 89% with copious amount of foam and liquid removed over the past 30 minutes."  Patient father was told by the anesthesiologist he was concerned for secretion and his lung sounds. He recommended to take patient to the ED. Father opted to take son to the PCP and have him evaluated by him . However he said physician saw that the patient is hypoxic and tachypneic . Patient was sent to the ED for follow-up.    In the ED     T(F): 99 (12 Jan 2024 13:42), Max: 99 (12 Jan 2024 13:42)  HR: 105 (12 Jan 2024 20:00) (105 - 138)  BP: 127/66 (12 Jan 2024 20:00) (127/66 - 137/97)  RR: 18 (12 Jan 2024 20:00) (16 - 20)  SpO2: 97% (12 Jan 2024 20:00) (83% - 99%)    O2 Parameters below as of 12 Jan 2024 22:28  Patient On (Oxygen Delivery Method): nasal cannula  O2 Flow (L/min): 3      LABS: significant for leukocytosis. VBG showing Hypercapnia resolved with BIPAP    Patient examined off bipap and tolerating it well       < from: Xray Chest 1 View- PORTABLE-Urgent (01.12.24 @ 14:36) >    Impression:    Low lung volumes with no radiographic evidence of acute cardiopulmonary   disease.    < end of copied text >  GENERAL: NAD, lying in bed comfortably  CHEST/LUNG: lungs clear.   HEART: Regular rate and rhythm; No murmurs, rubs, or gallops  ABDOMEN: Bowel sounds present; Soft, Nontender, Nondistended.    EXTREMITIES:  no edema  NERVOUS SYSTEM:  unable to assess as patient is nonverbal but follows commands  MSK: FROM all 4 extremities, full and equal strengths    Impression    AHFR with hypercapnia - possible OHS/RUIFNO  Aspiration pneumonitis?  HAGMA- lactic acidosis   tachycardia   Hypothyroid  Anxiety  Autism     Plan   Continue unasyn   start fluids 75 cc/hr  Duoneb prn   feeding if tolerating off bipap  wean off o2   Bipap at night and as needed   Check tsh   repeat lactate  repeat cxr in am   continue home meds   hold benzo   Pulm follow up outpatient for sleep study     Dispo: wean off bipap, IV abx, monitor for fever , lactic acid,   likely anticipate dc simone am

## 2024-01-12 NOTE — H&P ADULT - NSHPPHYSICALEXAM_GEN_ALL_CORE
GENERAL: NAD, lying in bed comfortably  CHEST/LUNG: lungs clear.   HEART: Regular rate and rhythm; No murmurs, rubs, or gallops  ABDOMEN: Bowel sounds present; Soft, Nontender, Nondistended.    EXTREMITIES:  no edema  NERVOUS SYSTEM:  unable to assess as patient is nonverbal but follows commands  MSK: FROM all 4 extremities, full and equal strength Gen: NAD  HEENT: PERRL, EOMI, mouth clr, nose clr  Neck: no nodes, no JVD, thyroid nl  lungs: crackles LLL > RLL  hrt: s1 s2 rrr no murmur  abd: soft, NT/ND, no HS megaly  ext: no edema, no c/c  neuro: aa ox1, cn intact, can move all 4 ext

## 2024-01-12 NOTE — ED ADULT NURSE NOTE - CHIEF COMPLAINT QUOTE
Pt intubated today for dental procedure, sent by outpt MD due to possible aspiration and SOB. Pt lethargic in triage.  PTA.

## 2024-01-12 NOTE — H&P ADULT - ASSESSMENT
- prn klonopin    43-year-old male history of autism, nonverbal presenting for evaluation of lethargy, SOB and hypoxia after a dental procedure which was done under anesthesia      #Acute hypoxic and hypercapneic respiratory failure  #aspiration post extubation after dental procedure?  #Obesity hypoventillation vs RUFINO? (snores at night)  Chest X-Ray: no acute path   ABG showing prim resp acidosis w 2ndry met acidosis  wbc 22  no fever   coughing at encounter  sp BiPAP now on NC 4l (satting well)  crackles LLL, not wheezing  - unaysn  - procal  - OP pulm eval  - S+S    #autism  #anxiety  - cont home escitalopram 40 qd  - prn klonopin    #hypothyroidism  - cont synthroid    #hyperkalemia  - one dose lokelma    dvt ppx: lovenox  GI ppx: protonix  diet: NPO  activity: AAT

## 2024-01-12 NOTE — ED PROVIDER NOTE - PROGRESS NOTE DETAILS
pt placed on bipap for hypercapnic resp failure. pt doing well on bipap pt doing better on bipap, approved for SDU

## 2024-01-12 NOTE — ED PROVIDER NOTE - CLINICAL SUMMARY MEDICAL DECISION MAKING FREE TEXT BOX
in ed, pt in hypoxemic hypercapnic resp failure  initial vbg w/ resp acidosis and pt started on bipap  wob stable and hypercapniea improving  pt covered for aspiration event w/ steroid, abx  pt approved for sdu

## 2024-01-12 NOTE — ED PROVIDER NOTE - ATTENDING CONTRIBUTION TO CARE
42 yo m hx MR, anxiety  pt here for eval of lethargy after anesthesia. pt s/p dental procedure and was placed under anesthesia. pt given [id number], fentanyl 100mg and versed 2mg for premedication, propofol 100mg and robinul 2mg for induction.  pt was under anesthesia from 9am to 10:05 am. anesthesia paperwork states "10 minutes post intubation, foamy liquid removed from ett. pt saturated 89%. copious amounts of foamy liquid was removed over 30 minutes." pt nonverbal. pt received albuterol by ems 42 yo m hx MR, anxiety  pt here for eval of lethargy after anesthesia. pt s/p dental procedure and was placed under anesthesia. pt given [id number], fentanyl 100mg and versed 2mg for premedication, propofol 100mg and robinul 2mg for induction.  pt was under anesthesia from 9am to 10:05 am. anesthesia paperwork states "10 minutes post intubation, foamy liquid removed from ett. pt saturated 89%. copious amounts of foamy liquid was removed over 30 minutes." pt nonverbal. pt received albuterol by ems 40 yo m hx MR, anxiety  pt here for eval of lethargy after anesthesia. pt s/p dental procedure and was placed under anesthesia. pt given [id number], fentanyl 100mg and versed 2mg for premedication, propofol 100mg and robinul 2mg for induction.  pt was under anesthesia from 9am to 10:05 am. anesthesia paperwork states "10 minutes post intubation, foamy liquid removed from ett. pt saturated 89%. copious amounts of foamy liquid was removed over 30 minutes." pt nonverbal. pt received albuterol by ems. pt initially more tired at triage but is more alert as he comes to critical care area.     vs 93% on RA  gen- NAD, awake, alert, nonverbal   HENT- no lip/tongue/uvula/tonsillar swelling, no exudates on tonsils, uvula midline, base of tongue normal, pt tolerating secretions. no drooling, no stridor.  no hot potato voice, no pooling secretions in airway, mild hoarse voice   card-rrr  lungs-no resp distress, mild tachypniae, lower lung field coarse breath sounds  abd-sntnd, no guarding or rebound  neuro- full str/sensation, cn ii-xii grossly intact, normal coordination 40 yo m hx MR, anxiety  pt here for eval of lethargy after anesthesia. pt s/p dental procedure and was placed under anesthesia. pt given [id number], fentanyl 100mg and versed 2mg for premedication, propofol 100mg and robinul 2mg for induction.  pt was under anesthesia from 9am to 10:05 am. anesthesia paperwork states "10 minutes post intubation, foamy liquid removed from ett. pt saturated 89%. copious amounts of foamy liquid was removed over 30 minutes." pt nonverbal. pt received albuterol by ems. pt initially more tired at triage but is more alert as he comes to critical care area.     vs 93% on RA  gen- NAD, awake, alert, nonverbal   HENT- no lip/tongue/uvula/tonsillar swelling, no exudates on tonsils, uvula midline, base of tongue normal, pt tolerating secretions. no drooling, no stridor.  no hot potato voice, no pooling secretions in airway, mild hoarse voice   card-rrr  lungs-no resp distress, mild tachypniae, lower lung field coarse breath sounds  abd-sntnd, no guarding or rebound  neuro- full str/sensation, cn ii-xii grossly intact, normal coordination

## 2024-01-12 NOTE — H&P ADULT - TIME BILLING
the following     Reviewed prior external records [x ]  Considering/ Ordered a unique test:  Labs [x ] Imaging [ ] Stress Test  [ ] Other: Specify [ ]  Reviewed each unique test result [ x]   Assessment requiring an independent historian [ x]  Independent interpretation of:   X-Ray [ x] EKG [ ]  Other: Specify  [ ]  Discussion of management of tests with clinician outside of my group [ ]

## 2024-01-12 NOTE — ED ADULT NURSE NOTE - COVID-19 ORDERING FACILITY
[facility]  - [facility] at [facility]  - [facility]

## 2024-01-12 NOTE — ED PROVIDER NOTE - OBJECTIVE STATEMENT
43-year-old male history of autism, nonverbal presenting for evaluation of lethargy, possible aspiration and shortness of breath after dental procedure.  Patient was intubated for dental rehab of dental caries and ambulatory dental office with deep sedation. Per office records " Patient received fentanyl 100, Versed 2, ketamine 10.  Was induced with propofol 100 and ana laura apparently 2.  10 minutes postintubation foamy liquid was removed from ET tube patient desaturated to 89% with copious amount of foam and liquid removed over the past 30 minutes."  Patient was sent to the ED for follow-up.

## 2024-01-13 ENCOUNTER — TRANSCRIPTION ENCOUNTER (OUTPATIENT)
Age: 44
End: 2024-01-13

## 2024-01-13 VITALS
TEMPERATURE: 98 F | SYSTOLIC BLOOD PRESSURE: 130 MMHG | HEART RATE: 103 BPM | RESPIRATION RATE: 18 BRPM | DIASTOLIC BLOOD PRESSURE: 77 MMHG | OXYGEN SATURATION: 100 %

## 2024-01-13 LAB
ALBUMIN SERPL ELPH-MCNC: 4.2 G/DL — SIGNIFICANT CHANGE UP (ref 3.5–5.2)
ALBUMIN SERPL ELPH-MCNC: 4.2 G/DL — SIGNIFICANT CHANGE UP (ref 3.5–5.2)
ALP SERPL-CCNC: 97 U/L — SIGNIFICANT CHANGE UP (ref 30–115)
ALP SERPL-CCNC: 97 U/L — SIGNIFICANT CHANGE UP (ref 30–115)
ALT FLD-CCNC: 25 U/L — SIGNIFICANT CHANGE UP (ref 0–41)
ALT FLD-CCNC: 25 U/L — SIGNIFICANT CHANGE UP (ref 0–41)
ANION GAP SERPL CALC-SCNC: 11 MMOL/L — SIGNIFICANT CHANGE UP (ref 7–14)
ANION GAP SERPL CALC-SCNC: 11 MMOL/L — SIGNIFICANT CHANGE UP (ref 7–14)
AST SERPL-CCNC: 31 U/L — SIGNIFICANT CHANGE UP (ref 0–41)
AST SERPL-CCNC: 31 U/L — SIGNIFICANT CHANGE UP (ref 0–41)
BASOPHILS # BLD AUTO: 0.02 K/UL — SIGNIFICANT CHANGE UP (ref 0–0.2)
BASOPHILS # BLD AUTO: 0.02 K/UL — SIGNIFICANT CHANGE UP (ref 0–0.2)
BASOPHILS NFR BLD AUTO: 0.1 % — SIGNIFICANT CHANGE UP (ref 0–1)
BASOPHILS NFR BLD AUTO: 0.1 % — SIGNIFICANT CHANGE UP (ref 0–1)
BILIRUB SERPL-MCNC: 0.6 MG/DL — SIGNIFICANT CHANGE UP (ref 0.2–1.2)
BILIRUB SERPL-MCNC: 0.6 MG/DL — SIGNIFICANT CHANGE UP (ref 0.2–1.2)
BUN SERPL-MCNC: 16 MG/DL — SIGNIFICANT CHANGE UP (ref 10–20)
BUN SERPL-MCNC: 16 MG/DL — SIGNIFICANT CHANGE UP (ref 10–20)
CALCIUM SERPL-MCNC: 9.5 MG/DL — SIGNIFICANT CHANGE UP (ref 8.4–10.5)
CALCIUM SERPL-MCNC: 9.5 MG/DL — SIGNIFICANT CHANGE UP (ref 8.4–10.5)
CHLORIDE SERPL-SCNC: 99 MMOL/L — SIGNIFICANT CHANGE UP (ref 98–110)
CHLORIDE SERPL-SCNC: 99 MMOL/L — SIGNIFICANT CHANGE UP (ref 98–110)
CO2 SERPL-SCNC: 25 MMOL/L — SIGNIFICANT CHANGE UP (ref 17–32)
CO2 SERPL-SCNC: 25 MMOL/L — SIGNIFICANT CHANGE UP (ref 17–32)
CREAT SERPL-MCNC: 0.9 MG/DL — SIGNIFICANT CHANGE UP (ref 0.7–1.5)
CREAT SERPL-MCNC: 0.9 MG/DL — SIGNIFICANT CHANGE UP (ref 0.7–1.5)
D DIMER BLD IA.RAPID-MCNC: 426 NG/ML DDU — HIGH
D DIMER BLD IA.RAPID-MCNC: 426 NG/ML DDU — HIGH
EGFR: 107 ML/MIN/1.73M2 — SIGNIFICANT CHANGE UP
EGFR: 107 ML/MIN/1.73M2 — SIGNIFICANT CHANGE UP
EOSINOPHIL # BLD AUTO: 0 K/UL — SIGNIFICANT CHANGE UP (ref 0–0.7)
EOSINOPHIL # BLD AUTO: 0 K/UL — SIGNIFICANT CHANGE UP (ref 0–0.7)
EOSINOPHIL NFR BLD AUTO: 0 % — SIGNIFICANT CHANGE UP (ref 0–8)
EOSINOPHIL NFR BLD AUTO: 0 % — SIGNIFICANT CHANGE UP (ref 0–8)
GLUCOSE SERPL-MCNC: 157 MG/DL — HIGH (ref 70–99)
GLUCOSE SERPL-MCNC: 157 MG/DL — HIGH (ref 70–99)
HCT VFR BLD CALC: 43.4 % — SIGNIFICANT CHANGE UP (ref 42–52)
HCT VFR BLD CALC: 43.4 % — SIGNIFICANT CHANGE UP (ref 42–52)
HGB BLD-MCNC: 14.9 G/DL — SIGNIFICANT CHANGE UP (ref 14–18)
HGB BLD-MCNC: 14.9 G/DL — SIGNIFICANT CHANGE UP (ref 14–18)
IMM GRANULOCYTES NFR BLD AUTO: 0.5 % — HIGH (ref 0.1–0.3)
IMM GRANULOCYTES NFR BLD AUTO: 0.5 % — HIGH (ref 0.1–0.3)
LACTATE SERPL-SCNC: 3.3 MMOL/L — HIGH (ref 0.7–2)
LACTATE SERPL-SCNC: 3.3 MMOL/L — HIGH (ref 0.7–2)
LYMPHOCYTES # BLD AUTO: 1.38 K/UL — SIGNIFICANT CHANGE UP (ref 1.2–3.4)
LYMPHOCYTES # BLD AUTO: 1.38 K/UL — SIGNIFICANT CHANGE UP (ref 1.2–3.4)
LYMPHOCYTES # BLD AUTO: 7.6 % — LOW (ref 20.5–51.1)
LYMPHOCYTES # BLD AUTO: 7.6 % — LOW (ref 20.5–51.1)
MAGNESIUM SERPL-MCNC: 2 MG/DL — SIGNIFICANT CHANGE UP (ref 1.8–2.4)
MAGNESIUM SERPL-MCNC: 2 MG/DL — SIGNIFICANT CHANGE UP (ref 1.8–2.4)
MCHC RBC-ENTMCNC: 29.9 PG — SIGNIFICANT CHANGE UP (ref 27–31)
MCHC RBC-ENTMCNC: 29.9 PG — SIGNIFICANT CHANGE UP (ref 27–31)
MCHC RBC-ENTMCNC: 34.3 G/DL — SIGNIFICANT CHANGE UP (ref 32–37)
MCHC RBC-ENTMCNC: 34.3 G/DL — SIGNIFICANT CHANGE UP (ref 32–37)
MCV RBC AUTO: 87 FL — SIGNIFICANT CHANGE UP (ref 80–94)
MCV RBC AUTO: 87 FL — SIGNIFICANT CHANGE UP (ref 80–94)
MONOCYTES # BLD AUTO: 1.02 K/UL — HIGH (ref 0.1–0.6)
MONOCYTES # BLD AUTO: 1.02 K/UL — HIGH (ref 0.1–0.6)
MONOCYTES NFR BLD AUTO: 5.6 % — SIGNIFICANT CHANGE UP (ref 1.7–9.3)
MONOCYTES NFR BLD AUTO: 5.6 % — SIGNIFICANT CHANGE UP (ref 1.7–9.3)
NEUTROPHILS # BLD AUTO: 15.61 K/UL — HIGH (ref 1.4–6.5)
NEUTROPHILS # BLD AUTO: 15.61 K/UL — HIGH (ref 1.4–6.5)
NEUTROPHILS NFR BLD AUTO: 86.2 % — HIGH (ref 42.2–75.2)
NEUTROPHILS NFR BLD AUTO: 86.2 % — HIGH (ref 42.2–75.2)
NRBC # BLD: 0 /100 WBCS — SIGNIFICANT CHANGE UP (ref 0–0)
NRBC # BLD: 0 /100 WBCS — SIGNIFICANT CHANGE UP (ref 0–0)
PLATELET # BLD AUTO: 307 K/UL — SIGNIFICANT CHANGE UP (ref 130–400)
PLATELET # BLD AUTO: 307 K/UL — SIGNIFICANT CHANGE UP (ref 130–400)
PMV BLD: 9.3 FL — SIGNIFICANT CHANGE UP (ref 7.4–10.4)
PMV BLD: 9.3 FL — SIGNIFICANT CHANGE UP (ref 7.4–10.4)
POTASSIUM SERPL-MCNC: 4.2 MMOL/L — SIGNIFICANT CHANGE UP (ref 3.5–5)
POTASSIUM SERPL-MCNC: 4.2 MMOL/L — SIGNIFICANT CHANGE UP (ref 3.5–5)
POTASSIUM SERPL-SCNC: 4.2 MMOL/L — SIGNIFICANT CHANGE UP (ref 3.5–5)
POTASSIUM SERPL-SCNC: 4.2 MMOL/L — SIGNIFICANT CHANGE UP (ref 3.5–5)
PROT SERPL-MCNC: 7.4 G/DL — SIGNIFICANT CHANGE UP (ref 6–8)
PROT SERPL-MCNC: 7.4 G/DL — SIGNIFICANT CHANGE UP (ref 6–8)
RBC # BLD: 4.99 M/UL — SIGNIFICANT CHANGE UP (ref 4.7–6.1)
RBC # BLD: 4.99 M/UL — SIGNIFICANT CHANGE UP (ref 4.7–6.1)
RBC # FLD: 13.2 % — SIGNIFICANT CHANGE UP (ref 11.5–14.5)
RBC # FLD: 13.2 % — SIGNIFICANT CHANGE UP (ref 11.5–14.5)
SODIUM SERPL-SCNC: 135 MMOL/L — SIGNIFICANT CHANGE UP (ref 135–146)
SODIUM SERPL-SCNC: 135 MMOL/L — SIGNIFICANT CHANGE UP (ref 135–146)
WBC # BLD: 18.12 K/UL — HIGH (ref 4.8–10.8)
WBC # BLD: 18.12 K/UL — HIGH (ref 4.8–10.8)
WBC # FLD AUTO: 18.12 K/UL — HIGH (ref 4.8–10.8)
WBC # FLD AUTO: 18.12 K/UL — HIGH (ref 4.8–10.8)

## 2024-01-13 PROCEDURE — 94618 PULMONARY STRESS TESTING: CPT | Mod: 26

## 2024-01-13 PROCEDURE — 99239 HOSP IP/OBS DSCHRG MGMT >30: CPT

## 2024-01-13 PROCEDURE — 71045 X-RAY EXAM CHEST 1 VIEW: CPT | Mod: 26

## 2024-01-13 RX ORDER — SODIUM CHLORIDE 9 MG/ML
500 INJECTION INTRAMUSCULAR; INTRAVENOUS; SUBCUTANEOUS ONCE
Refills: 0 | Status: COMPLETED | OUTPATIENT
Start: 2024-01-13 | End: 2024-01-13

## 2024-01-13 RX ADMIN — AMPICILLIN SODIUM AND SULBACTAM SODIUM 200 GRAM(S): 250; 125 INJECTION, POWDER, FOR SUSPENSION INTRAMUSCULAR; INTRAVENOUS at 08:11

## 2024-01-13 RX ADMIN — ENOXAPARIN SODIUM 40 MILLIGRAM(S): 100 INJECTION SUBCUTANEOUS at 08:46

## 2024-01-13 RX ADMIN — AMPICILLIN SODIUM AND SULBACTAM SODIUM 200 GRAM(S): 250; 125 INJECTION, POWDER, FOR SUSPENSION INTRAMUSCULAR; INTRAVENOUS at 01:14

## 2024-01-13 RX ADMIN — SODIUM CHLORIDE 1000 MILLILITER(S): 9 INJECTION INTRAMUSCULAR; INTRAVENOUS; SUBCUTANEOUS at 12:36

## 2024-01-13 NOTE — DISCHARGE NOTE PROVIDER - PROVIDER TOKENS
PROVIDER:[TOKEN:[[id number]],FOLLOWUP:[1 week]] PROVIDER:[TOKEN:[[id number]],FOLLOWUP:[1 week]]

## 2024-01-13 NOTE — CONSULT NOTE ADULT - ASSESSMENT
IMPRESSION:  acute resp failure resolved   ?? asp pna         PLAN:    CNS: no sedation   patient at his baseline     HEENT: oral care     PULMONARY: repeat cxr   keep pox >92% check pox on RA rest and exertion   nebulzier as needed     CARDIOVASCULAR: keep is=os    GI: GI prophylaxis.  Feeding     RENAL: follow lytes repeat BMP K     INFECTIOUS DISEASE: on unasyn   check procal   follow wbc     HEMATOLOGICAL:  DVT prophylaxis.    ENDOCRINE:  Follow up FS.  Insulin protocol if needed.    MUSCULOSKELETAL:  downgrade to floor spoke to hospitalist       CRITICAL CARE TIME SPENT: ***

## 2024-01-13 NOTE — SWALLOW BEDSIDE ASSESSMENT ADULT - NS SPL SWALLOW CLINIC TRIAL FT
Pt presents w/ no overt s/s of aspiration/penetration while consuming regular and thin liquids. Pt requires cues to not speak while eating.

## 2024-01-13 NOTE — DISCHARGE NOTE PROVIDER - CARE PROVIDERS DIRECT ADDRESSES
[email or web address]

## 2024-01-13 NOTE — CHART NOTE - NSCHARTNOTEFT_GEN_A_CORE
Transfer from SDU to medical Floor      43-year-old male history of autism, nonverbal presenting for evaluation of lethargy, SOB and hypoxia after a dental procedure.   Patient was intubated for dental cleaning at an ambulatory dental office with deep sedation (as pt difficult). Per office records " pt given [id number], fentanyl 100mg and versed 2mg for premedication, propofol 100mg and robinul 2mg for induction.  pt was under anesthesia from 9am to 10:05 am." anesthesia paperwork states "10 minutes post intubation, foamy liquid removed from ett. pt saturated 89%. copious amounts of foamy liquid was removed over 30 minutes."  Father says he walked out of the dental office, was taken to his PCP where his spo2 further dropped and was sent to the ED.  as per ED record: pt initially more tired at triage but is more alert as he comes to critical care area.   At my encounter pt is awake alert comfortably sleeping, saturating 100% on 4L NC.    in ED,   spo2 83 on RA ==> was placed on BiPAP  wbc 22.7   K 5.7   AG 22  AST 64  lact 5.1 ==> 2.1 on VBG  pco2 79 on ABG, HCO3 31 on ABG  ekg: sinus tahcy  cxr: no new path    Pt was placed on Bipap in the ED ==> NRB ==> NC  sp unasyn  +  2l bolus  +  dexa 10   +   albuterol        Patient was COVID positive 10 days ago. Saturating well on RA. Continuing with unasyn for possible. aspiration pna. rpt CXR stable. Patient evaluated at bedside by nurse for dysphagia and is tolerating diet, no sign of dysphagia. Patient stable to be downgraded to floor. Following wbc and procal. Transfer from SDU to medical Floor      43-year-old male history of autism, nonverbal presenting for evaluation of lethargy, SOB and hypoxia after a dental procedure.   Patient was intubated for dental cleaning at an ambulatory dental office with deep sedation (as pt difficult). Per office records " pt given [id number], fentanyl 100mg and versed 2mg for premedication, propofol 100mg and robinul 2mg for induction.  pt was under anesthesia from 9am to 10:05 am." anesthesia paperwork states "10 minutes post intubation, foamy liquid removed from ett. pt saturated 89%. copious amounts of foamy liquid was removed over 30 minutes."  Father says he walked out of the dental office, was taken to his PCP where his spo2 further dropped and was sent to the ED.  as per ED record: pt initially more tired at triage but is more alert as he comes to critical care area.   At my encounter pt is awake alert comfortably sleeping, saturating 100% on 4L NC.    in ED,   spo2 83 on RA ==> was placed on BiPAP  wbc 22.7   K 5.7   AG 22  AST 64  lact 5.1 ==> 2.1 on VBG  pco2 79 on ABG, HCO3 31 on ABG  ekg: sinus tahcy  cxr: no new path    Pt was placed on Bipap in the ED ==> NRB ==> NC  sp unasyn  +  2l bolus  +  dexa 10   +   albuterol        Patient was COVID positive 10 days ago. Saturating well on RA. Continuing with unasyn for possible. aspiration pna. rpt CXR stable. Patient evaluated at bedside by nurse for dysphagia and is tolerating diet, no sign of dysphagia. Patient stable to be downgraded to floor. Following wbc and procal.

## 2024-01-13 NOTE — DISCHARGE NOTE PROVIDER - CARE PROVIDER_API CALL
Kenneth Anthony  [facility]  [address] [place] [address]  Phone: [phone]  Fax: [phone]  Follow Up Time: 1 week   Kenneth Anthony  [facility]  [address] [place] [address]  Phone: [phone]  Fax: [phone]  Follow Up Time: 1 week

## 2024-01-13 NOTE — CHART NOTE - NSCHARTNOTEFT_GEN_A_CORE
The patient's family wishes to leave against medical advice.  I have discussed the risks, benefits and alternatives (including the possibility of worsening of disease, pain, permanent disability, and/or death) with the patient and family.  The patient's family voices understanding of these risks, benefits, and alternatives and still wishes to sign out against medical advice.  The patient's guardian at bedside was the father, Kai Quijano and has demonstrated capacity to refuse/direct care. I have advised that they can and should return immediately should they develop any worse/different/additional symptoms, or if they change their mind and want to continue their care.

## 2024-01-13 NOTE — SWALLOW BEDSIDE ASSESSMENT ADULT - SLP PERTINENT HISTORY OF CURRENT PROBLEM
43-year-old male history of autism, nonverbal presenting for evaluation of lethargy, SOB and hypoxia after a dental procedure. 43-year-old male history of autism, nonverbal presenting for evaluation of lethargy, SOB and hypoxia after a dental procedure. Patient was intubated for dentalcleaning at an ambulatory dental office with deep sedation (as pt difficult). Per office records " pt given [id number], fentanyl 100mg and versed 2mg for premedication, propofol 100mg and robinul 2mg for induction.  pt was under anesthesia from 9am to 10:05 am." anesthesia paperwork states "10 minutes post intubation, foamy liquid removed from ett. pt saturated 89%. copious amounts of foamy liquid was removed over 30 minutes." Father says he walked out of the dental office, was taked to his PCP where his spo2 further dropped and was sent to the ED. as per ED record: pt initially more tired at triage but is more alert as he comes to critical care area.  At my encounter pt is awake alert comfortably sleeping, saturating 100% on 4L NC. 43-year-old male history of autism, nonverbal presenting for evaluation of lethargy, SOB and hypoxia after a dental procedure. Patient was intubated for dentalcleaning at an ambulatory dental office with deep sedation (as pt difficult). Per office records " pt given [id number], fentanyl 100mg and versed 2mg for premedication, propofol 100mg and robinul 2mg for induction.  pt was under anesthesia from 9am to 10:05 am." anesthesia paperwork states "10 minutes post intubation, foamy liquid removed from ett. pt saturated 89%. copious amounts of foamy liquid was removed over 30 minutes." Father says he walked out of the dental office, was taked to his PCP where his spo2 further dropped and was sent to the ED. as per ED record: pt initially more tired at triage but is more alert as he comes to critical care area.  At my encounter pt is awake alert comfortably sleeping, saturating 100% on 4L NC.

## 2024-01-13 NOTE — DISCHARGE NOTE PROVIDER - HOSPITAL COURSE
43-year-old male history of autism, nonverbal presenting for evaluation of lethargy, SOB and hypoxia after a dental procedure.   Patient was intubated for dental cleaning at an ambulatory dental office with deep sedation (as pt difficult). Per office records " pt given [id number], fentanyl 100mg and versed 2mg for premedication, propofol 100mg and robinul 2mg for induction.  pt was under anesthesia from 9am to 10:05 am." anesthesia paperwork states "10 minutes post intubation, foamy liquid removed from ett. pt saturated 89%. copious amounts of foamy liquid was removed over 30 minutes."  Father says he walked out of the dental office, was taken to his PCP where his spo2 further dropped and was sent to the ED.  as per ED record: pt initially more tired at triage but is more alert as he comes to critical care area.   At my encounter pt is awake alert comfortably sleeping, saturating 100% on 4L NC.    in ED,   spo2 83 on RA ==> was placed on BiPAP  wbc 22.7   K 5.7   AG 22  AST 64  lact 5.1 ==> 2.1 on VBG  pco2 79 on ABG, HCO3 31 on ABG  ekg: sinus tahcy  cxr: no new path    Pt was placed on Bipap in the ED ==> NRB ==> NC  sp unasyn  +  2l bolus  +  dexa 10   +   albuterol        Patient was COVID positive over 1 week ago. Saturating well on RA. Continuing with unasyn for possible. aspiration pna. rpt CXR stable. Patient evaluated at bedside by nurse for dysphagia and is tolerating diet, no sign of dysphagia. Patient stable to be downgraded to floor. Following wbc and procal.     Patient family requested to leave AMA. it was explained to patient's family that patient is still acutely being treated and need further in patient management. They understood risks. Augmentin 875mg bid 7 days sent. Please follow up   43-year-old male history of autism, nonverbal presenting for evaluation of lethargy, SOB and hypoxia after a dental procedure.   Patient was intubated for dental cleaning at an ambulatory dental office with deep sedation (as pt difficult). Per office records " pt given [id number], fentanyl 100mg and versed 2mg for premedication, propofol 100mg and robinul 2mg for induction.  pt was under anesthesia from 9am to 10:05 am." anesthesia paperwork states "10 minutes post intubation, foamy liquid removed from ett. pt saturated 89%. copious amounts of foamy liquid was removed over 30 minutes."  Father says he walked out of the dental office, was taken to his PCP where his spo2 further dropped and was sent to the ED.  as per ED record: pt initially more tired at triage but is more alert as he comes to critical care area.   At my encounter pt is awake alert comfortably sleeping, saturating 100% on 4L NC.    in ED,   spo2 83 on RA ==> was placed on BiPAP  wbc 22.7   K 5.7   AG 22  AST 64  lact 5.1 ==> 2.1 on VBG  pco2 79 on ABG, HCO3 31 on ABG  ekg: sinus tahcy  cxr: no new path    Pt was placed on Bipap in the ED ==> NRB ==> NC  sp unasyn  +  2l bolus  +  dexa 10   +   albuterol        Patient was COVID positive over 1 week ago. Saturating well on RA. Continuing with unasyn for possible. aspiration pna. rpt CXR stable. Patient evaluated at bedside by nurse for dysphagia and is tolerating diet, no sign of dysphagia. Patient stable to be downgraded to floor. Following wbc and procal.     Patient family requested to leave AMA. it was explained to patient's family that patient is still acutely being treated and need further in patient management. They understood risks. Augmentin 875mg bid 7 days sent. Please follow up

## 2024-01-13 NOTE — DISCHARGE NOTE PROVIDER - NSDCCPCAREPLAN_GEN_ALL_CORE_FT
PRINCIPAL DISCHARGE DIAGNOSIS  Diagnosis: Acute hypoxic respiratory failure  Assessment and Plan of Treatment: You were evaluated in the hospital for your respiratory failure. Aspiration pneumonia is pneumonia that is caused by something other than air being inhaled (aspirated) into your respiratory tract. These non-air substances can be food, liquid, saliva, stomach contents, toxins or even a small foreign object.  There’s also a condition called aspiration pneumonitis which is caused by the same type of thing happening but there is only inflammation (swelling) and irritation, not infection.   Please note that you are leaving against medical adivce and there are risks to not finishing treatment including death. Continue with augumentin 875mg twice daily for 7 days.   After discharge, you will need to:   - Follow up with your primary care doctor within 1-2 weeks   - Take all the medications you were discharged with, unless otherwise instructed by your healthcare provider(s).   Please follow up with your providers by calling them to make an appointment so that you can see them in 1-2weeks; bring your paperwork from this hospital stay to that visit. You can access your visit information by signing up for an account for the patient portal at [email or web address]   Seek immediate medical attention if you develop fevers, chills, chest pain, shortness of breath, nausea and vomiting, abdominal pain, passing out, weakness or numbness or tingling on one side of your body, or any other concerning signs or symptoms.       PRINCIPAL DISCHARGE DIAGNOSIS  Diagnosis: Acute hypoxic respiratory failure  Assessment and Plan of Treatment: You were evaluated in the hospital for your respiratory failure. Aspiration pneumonia is pneumonia that is caused by something other than air being inhaled (aspirated) into your respiratory tract. These non-air substances can be food, liquid, saliva, stomach contents, toxins or even a small foreign object.  There’s also a condition called aspiration pneumonitis which is caused by the same type of thing happening but there is only inflammation (swelling) and irritation, not infection.   Please note that you are leaving against medical adivce and there are risks to not finishing treatment including death. Continue with augumentin 875mg twice daily for 7 days.   After discharge, you will need to:   - Follow up with your primary care doctor within 1-2 weeks   - Take all the medications you were discharged with, unless otherwise instructed by your healthcare provider(s).   Please follow up with your providers by calling them to make an appointment so that you can see them in 1-2weeks; bring your paperwork from this hospital stay to that visit. You can access your visit information by signing up for an account for the patient portal at [email or web address]   Seek immediate medical attention if you develop fevers, chills, chest pain, shortness of breath, nausea and vomiting, abdominal pain, passing out, weakness or numbness or tingling on one side of your body, or any other concerning signs or symptoms.

## 2024-01-13 NOTE — SWALLOW BEDSIDE ASSESSMENT ADULT - SLP GENERAL OBSERVATIONS
Pt found sitting in chair alert and awake w/ father and sister at bedside. Family reports pt has never had difficulty swallowing and this was an isolated incident. As per family pt was at dentist for a cleaning but requires anesthesia. Pt able to communicate wants/needs and ask questions.

## 2024-01-13 NOTE — DISCHARGE NOTE PROVIDER - ATTENDING DISCHARGE PHYSICAL EXAMINATION:
Pt with Intellectual disability. Spoke to family at bedside and care givers. Discussed plan. Pt requiring further hospitalization for infection.  The team explained at length to the patient the risks of signing out AMA including but not limited to harm, injury or death. The team explained the risks, benefits and alternatives to treatment as well as the attendant risks of refusing treatment at this time. We offered to answer any questions and fully answered any such questions. We believe that the patients family  fully understands what has been explained and answered. Patient family signed form to sign out AMA and accepted responsibility for any and all results of this decision.

## 2024-01-13 NOTE — CONSULT NOTE ADULT - SUBJECTIVE AND OBJECTIVE BOX
Patient is a 43y old  Male who presents with a chief complaint of ARF (12 Jan 2024 22:13)      HPI:  43-year-old male history of autism, nonverbal presenting for evaluation of lethargy, SOB and hypoxia after a dental procedure.   Patient was intubated for dentalcleaning at an ambulatory dental office with deep sedation (as pt difficult). Per office records " pt given [id number], fentanyl 100mg and versed 2mg for premedication, propofol 100mg and robinul 2mg for induction.  pt was under anesthesia from 9am to 10:05 am." anesthesia paperwork states "10 minutes post intubation, foamy liquid removed from ett. pt saturated 89%. copious amounts of foamy liquid was removed over 30 minutes."  Father says he walked out of the dental office, was taked to his PCP where his spo2 further dropped and was sent to the ED.  as per ED record: pt initially more tired at triage but is more alert as he comes to critical care area.   At my encounter pt is awake alert comfortably sleeping, saturating 100% on 4L NC.    in ED,   spo2 83 on RA ==> was placed on BiPAP  wbc 22.7   K 5.7   AG 22  AST 64  lact 5.1 ==> 2.1 on VBG  pco2 79 on ABG, HCO3 31 on ABG  ekg: sinus tahcy  cxr: no new path    Pt was placed on Bipap in the ED ==> NRB ==> NC  sp unasyn  +  2l bolus  +  dexa 10   +   albuterol     (12 Jan 2024 22:13)  this morning on NC comfortable at baseline     PAST MEDICAL & SURGICAL HISTORY:  Autism      Anxiety        Allergies    No Known Allergies    Intolerances      Family history : no cardiovscular family history   Home Medications:  escitalopram 20 mg oral tablet: 2 tab(s) orally once a day (12 Jan 2024 23:09)  Synthroid 50 mcg (0.05 mg) oral tablet: 1 tab(s) orally once a day (12 Jan 2024 23:09)    Occupation:  Alochol: Denied  Smoking: Denied  Drug Use: Denied  Marital Status:         ROS: as in HPI; All other systems reviewed are negative    ICU Vital Signs Last 24 Hrs  T(C): 36.5 (13 Jan 2024 07:45), Max: 37.2 (12 Jan 2024 13:42)  T(F): 97.7 (13 Jan 2024 07:45), Max: 99 (12 Jan 2024 13:42)  HR: 105 (13 Jan 2024 07:45) (94 - 138)  BP: 114/79 (13 Jan 2024 07:45) (113/68 - 137/97)  BP(mean): --  ABP: --  ABP(mean): --  RR: 18 (13 Jan 2024 07:45) (16 - 20)  SpO2: 99% (13 Jan 2024 07:45) (83% - 100%)    O2 Parameters below as of 13 Jan 2024 07:45  Patient On (Oxygen Delivery Method): room air  O2 Flow (L/min): 3            Physical Examination:    General: No acute distress.  Alert, oriented, interactive, nonfocal    HEENT: Pupils equal, reactive to light.  Symmetric.    PULM: Clear to auscultation bilaterally, no significant sputum production    CVS: Regular rate and rhythm, no murmurs, rubs, or gallops    ABD: Soft, nondistended, nontender, normoactive bowel sounds, no masses    EXT: No edema, nontender, no clubbing     SKIN: Warm and well perfused, no rashes noted.    Neurology : no motor or sensory deficit     Musculoskeletal : move all extremity     Lymphatic system: no Palpable node               I&O's Detail        LABS:                        16.4   22.17 )-----------( 309      ( 12 Jan 2024 14:37 )             48.5     12 Jan 2024 14:37    141    |  105    |  15     ----------------------------<  99     5.7     |  14     |  1.0      Ca    9.6        12 Jan 2024 14:37    TPro  7.6    /  Alb  4.4    /  TBili  0.6    /  DBili  x      /  AST  64     /  ALT  34     /  AlkPhos  112    12 Jan 2024 14:37  Amylase x     lipase x              CAPILLARY BLOOD GLUCOSE      POCT Blood Glucose.: 87 mg/dL (12 Jan 2024 13:57)      Urinalysis Basic - ( 12 Jan 2024 14:37 )    Color: x / Appearance: x / SG: x / pH: x  Gluc: 99 mg/dL / Ketone: x  / Bili: x / Urobili: x   Blood: x / Protein: x / Nitrite: x   Leuk Esterase: x / RBC: x / WBC x   Sq Epi: x / Non Sq Epi: x / Bacteria: x      Culture        MEDICATIONS  (STANDING):  ampicillin/sulbactam  IVPB 3 Gram(s) IV Intermittent every 6 hours  ampicillin/sulbactam  IVPB      enoxaparin Injectable 40 milliGRAM(s) SubCutaneous every 24 hours  lactated ringers. 1000 milliLiter(s) (75 mL/Hr) IV Continuous <Continuous>  pantoprazole    Tablet 40 milliGRAM(s) Oral before breakfast    MEDICATIONS  (PRN):        RADIOLOGY: ***     CXR:   TLC:  OG:  ET tube:        CAM ICU:  ECHO:           Patient is a 43y old  Male who presents with a chief complaint of ARF (12 Jan 2024 22:13)      HPI:  43-year-old male history of autism, nonverbal presenting for evaluation of lethargy, SOB and hypoxia after a dental procedure.   Patient was intubated for dentalcleaning at an ambulatory dental office with deep sedation (as pt difficult). Per office records " pt given [id number], fentanyl 100mg and versed 2mg for premedication, propofol 100mg and robinul 2mg for induction.  pt was under anesthesia from 9am to 10:05 am." anesthesia paperwork states "10 minutes post intubation, foamy liquid removed from ett. pt saturated 89%. copious amounts of foamy liquid was removed over 30 minutes."  Father says he walked out of the dental office, was taked to his PCP where his spo2 further dropped and was sent to the ED.  as per ED record: pt initially more tired at triage but is more alert as he comes to critical care area.   At my encounter pt is awake alert comfortably sleeping, saturating 100% on 4L NC.    in ED,   spo2 83 on RA ==> was placed on BiPAP  wbc 22.7   K 5.7   AG 22  AST 64  lact 5.1 ==> 2.1 on VBG  pco2 79 on ABG, HCO3 31 on ABG  ekg: sinus tahcy  cxr: no new path    Pt was placed on Bipap in the ED ==> NRB ==> NC  sp unasyn  +  2l bolus  +  dexa 10   +   albuterol     (12 Jan 2024 22:13)  this morning on NC comfortable at baseline     PAST MEDICAL & SURGICAL HISTORY:  Autism      Anxiety        Allergies    No Known Allergies    Intolerances      Family history : no cardiovscular family history   Home Medications:  escitalopram 20 mg oral tablet: 2 tab(s) orally once a day (12 Jan 2024 23:09)  Synthroid 50 mcg (0.05 mg) oral tablet: 1 tab(s) orally once a day (12 Jan 2024 23:09)    Occupation:  Alochol: Denied  Smoking: Denied  Drug Use: Denied  Marital Status:         ROS: as in HPI; All other systems reviewed are negative    ICU Vital Signs Last 24 Hrs  T(C): 36.5 (13 Jan 2024 07:45), Max: 37.2 (12 Jan 2024 13:42)  T(F): 97.7 (13 Jan 2024 07:45), Max: 99 (12 Jan 2024 13:42)  HR: 105 (13 Jan 2024 07:45) (94 - 138)  BP: 114/79 (13 Jan 2024 07:45) (113/68 - 137/97)  BP(mean): --  ABP: --  ABP(mean): --  RR: 18 (13 Jan 2024 07:45) (16 - 20)  SpO2: 99% (13 Jan 2024 07:45) (83% - 100%)    O2 Parameters below as of 13 Jan 2024 07:45  Patient On (Oxygen Delivery Method): room air  O2 Flow (L/min): 3            Physical Examination:    General: No acute distress.  Alert, oriented, interactive, nonfocal    HEENT: Pupils equal, reactive to light.  Symmetric.    PULM: Clear to auscultation bilaterally, no significant sputum production    CVS: Regular rate and rhythm, no murmurs, rubs, or gallops    ABD: Soft, nondistended, nontender, normoactive bowel sounds, no masses    EXT: No edema, nontender, no clubbing     SKIN: Warm and well perfused, no rashes noted.    Neurology : no motor or sensory deficit     Musculoskeletal : move all extremity     Lymphatic system: no Palpable node               I&O's Detail        LABS:                        16.4   22.17 )-----------( 309      ( 12 Jan 2024 14:37 )             48.5     12 Jan 2024 14:37    141    |  105    |  15     ----------------------------<  99     5.7     |  14     |  1.0      Ca    9.6        12 Jan 2024 14:37    TPro  7.6    /  Alb  4.4    /  TBili  0.6    /  DBili  x      /  AST  64     /  ALT  34     /  AlkPhos  112    12 Jan 2024 14:37  Amylase x     lipase x              CAPILLARY BLOOD GLUCOSE      POCT Blood Glucose.: 87 mg/dL (12 Jan 2024 13:57)      Urinalysis Basic - ( 12 Jan 2024 14:37 )    Color: x / Appearance: x / SG: x / pH: x  Gluc: 99 mg/dL / Ketone: x  / Bili: x / Urobili: x   Blood: x / Protein: x / Nitrite: x   Leuk Esterase: x / RBC: x / WBC x   Sq Epi: x / Non Sq Epi: x / Bacteria: x      Culture        MEDICATIONS  (STANDING):  ampicillin/sulbactam  IVPB 3 Gram(s) IV Intermittent every 6 hours  ampicillin/sulbactam  IVPB      enoxaparin Injectable 40 milliGRAM(s) SubCutaneous every 24 hours  lactated ringers. 1000 milliLiter(s) (75 mL/Hr) IV Continuous <Continuous>  pantoprazole    Tablet 40 milliGRAM(s) Oral before breakfast    MEDICATIONS  (PRN):        RADIOLOGY: ***     CXR:   TLC:  OG:  ET tube:        CAM ICU:  ECHO:

## 2024-01-13 NOTE — DISCHARGE NOTE PROVIDER - NSDCMRMEDTOKEN_GEN_ALL_CORE_FT
amoxicillin-clavulanate 875 mg-125 mg oral tablet: 875 milligram(s) orally 2 times a day  escitalopram 20 mg oral tablet: 2 tab(s) orally once a day  Synthroid 50 mcg (0.05 mg) oral tablet: 1 tab(s) orally once a day

## 2024-01-14 LAB
PROCALCITONIN SERPL-MCNC: 1.08 NG/ML — HIGH (ref 0.02–0.1)
PROCALCITONIN SERPL-MCNC: 1.08 NG/ML — HIGH (ref 0.02–0.1)

## 2024-01-24 DIAGNOSIS — E87.4 MIXED DISORDER OF ACID-BASE BALANCE: ICD-10-CM

## 2024-01-24 DIAGNOSIS — J96.01 ACUTE RESPIRATORY FAILURE WITH HYPOXIA: ICD-10-CM

## 2024-01-24 DIAGNOSIS — J69.0 PNEUMONITIS DUE TO INHALATION OF FOOD AND VOMIT: ICD-10-CM

## 2024-01-24 DIAGNOSIS — F41.9 ANXIETY DISORDER, UNSPECIFIED: ICD-10-CM

## 2024-01-24 DIAGNOSIS — J96.02 ACUTE RESPIRATORY FAILURE WITH HYPERCAPNIA: ICD-10-CM

## 2024-01-24 DIAGNOSIS — E87.5 HYPERKALEMIA: ICD-10-CM

## 2024-01-24 DIAGNOSIS — Z53.29 PROCEDURE AND TREATMENT NOT CARRIED OUT BECAUSE OF PATIENT'S DECISION FOR OTHER REASONS: ICD-10-CM

## 2024-01-24 DIAGNOSIS — E03.9 HYPOTHYROIDISM, UNSPECIFIED: ICD-10-CM

## 2024-01-24 DIAGNOSIS — Z86.16 PERSONAL HISTORY OF COVID-19: ICD-10-CM

## 2024-01-24 DIAGNOSIS — F84.0 AUTISTIC DISORDER: ICD-10-CM

## 2024-05-01 ENCOUNTER — NON-APPOINTMENT (OUTPATIENT)
Age: 44
End: 2024-05-01

## 2024-09-11 NOTE — ED ADULT NURSE NOTE - NSSUHOSCREENINGYN_ED_ALL_ED
Yes - the patient is able to be screened What Type Of Note Output Would You Prefer (Optional)?: Bullet Format How Severe Is Your Skin Lesion?: mild Is This A New Presentation, Or A Follow-Up?: Skin Lesions Additional History: No specific concerns today.